# Patient Record
Sex: FEMALE | Race: WHITE
[De-identification: names, ages, dates, MRNs, and addresses within clinical notes are randomized per-mention and may not be internally consistent; named-entity substitution may affect disease eponyms.]

---

## 2020-11-30 ENCOUNTER — HOSPITAL ENCOUNTER (OUTPATIENT)
Dept: HOSPITAL 95 - LAB EV | Age: 70
Discharge: HOME | End: 2020-11-30
Attending: FAMILY MEDICINE
Payer: COMMERCIAL

## 2020-11-30 DIAGNOSIS — R10.9: Primary | ICD-10-CM

## 2021-03-07 ENCOUNTER — HOSPITAL ENCOUNTER (INPATIENT)
Dept: HOSPITAL 95 - ER | Age: 71
LOS: 7 days | Discharge: HOME | DRG: 871 | End: 2021-03-14
Attending: HOSPITALIST | Admitting: HOSPITALIST
Payer: COMMERCIAL

## 2021-03-07 VITALS — BODY MASS INDEX: 41.78 KG/M2 | WEIGHT: 244.71 LBS | HEIGHT: 64.02 IN

## 2021-03-07 DIAGNOSIS — Z91.018: ICD-10-CM

## 2021-03-07 DIAGNOSIS — E66.9: ICD-10-CM

## 2021-03-07 DIAGNOSIS — A41.89: Primary | ICD-10-CM

## 2021-03-07 DIAGNOSIS — I10: ICD-10-CM

## 2021-03-07 DIAGNOSIS — E11.65: ICD-10-CM

## 2021-03-07 DIAGNOSIS — J12.82: ICD-10-CM

## 2021-03-07 DIAGNOSIS — T38.0X5A: ICD-10-CM

## 2021-03-07 DIAGNOSIS — J96.01: ICD-10-CM

## 2021-03-07 DIAGNOSIS — E88.81: ICD-10-CM

## 2021-03-07 DIAGNOSIS — Z79.899: ICD-10-CM

## 2021-03-07 DIAGNOSIS — U07.1: ICD-10-CM

## 2021-03-07 DIAGNOSIS — K92.1: ICD-10-CM

## 2021-03-07 DIAGNOSIS — Z90.710: ICD-10-CM

## 2021-03-07 LAB
ALBUMIN SERPL BCP-MCNC: 3.2 G/DL (ref 3.4–5)
ALBUMIN/GLOB SERPL: 0.7 {RATIO} (ref 0.8–1.8)
ALT SERPL W P-5'-P-CCNC: 44 U/L (ref 12–78)
ANION GAP SERPL CALCULATED.4IONS-SCNC: 8 MMOL/L (ref 6–16)
AST SERPL W P-5'-P-CCNC: 60 U/L (ref 12–37)
BASOPHILS # BLD AUTO: 0.03 K/MM3 (ref 0–0.23)
BASOPHILS NFR BLD AUTO: 0 % (ref 0–2)
BILIRUB SERPL-MCNC: 0.3 MG/DL (ref 0.1–1)
BUN SERPL-MCNC: 47 MG/DL (ref 8–24)
CALCIUM SERPL-MCNC: 9.3 MG/DL (ref 8.5–10.1)
CHLORIDE SERPL-SCNC: 112 MMOL/L (ref 98–108)
CO2 SERPL-SCNC: 21 MMOL/L (ref 21–32)
CREAT SERPL-MCNC: 1.34 MG/DL (ref 0.4–1)
DEPRECATED RDW RBC AUTO: 49.7 FL (ref 35.1–46.3)
EOSINOPHIL # BLD AUTO: 0 K/MM3 (ref 0–0.68)
EOSINOPHIL NFR BLD AUTO: 0 % (ref 0–6)
ERYTHROCYTE [DISTWIDTH] IN BLOOD BY AUTOMATED COUNT: 14.3 % (ref 11.7–14.2)
GLOBULIN SER CALC-MCNC: 4.8 G/DL (ref 2.2–4)
GLUCOSE SERPL-MCNC: 148 MG/DL (ref 70–99)
HCT VFR BLD AUTO: 44.8 % (ref 33–51)
HGB BLD-MCNC: 14.9 G/DL (ref 11.5–16)
IMM GRANULOCYTES # BLD AUTO: 0.1 K/MM3 (ref 0–0.1)
IMM GRANULOCYTES NFR BLD AUTO: 1 % (ref 0–1)
LYMPHOCYTES # BLD AUTO: 1.51 K/MM3 (ref 0.84–5.2)
LYMPHOCYTES NFR BLD AUTO: 17 % (ref 21–46)
MCHC RBC AUTO-ENTMCNC: 33.3 G/DL (ref 31.5–36.5)
MCV RBC AUTO: 94 FL (ref 80–100)
MONOCYTES # BLD AUTO: 0.74 K/MM3 (ref 0.16–1.47)
MONOCYTES NFR BLD AUTO: 8 % (ref 4–13)
NEUTROPHILS # BLD AUTO: 6.42 K/MM3 (ref 1.96–9.15)
NEUTROPHILS NFR BLD AUTO: 73 % (ref 41–73)
NRBC # BLD AUTO: 0 K/MM3 (ref 0–0.02)
NRBC BLD AUTO-RTO: 0 /100 WBC (ref 0–0.2)
PLATELET # BLD AUTO: 265 K/MM3 (ref 150–400)
POTASSIUM SERPL-SCNC: 4 MMOL/L (ref 3.5–5.5)
PROT SERPL-MCNC: 8 G/DL (ref 6.4–8.2)
PROTHROMBIN TIME: 10.9 SEC (ref 9.7–11.5)
SODIUM SERPL-SCNC: 141 MMOL/L (ref 136–145)
TROPONIN I SERPL-MCNC: <0.015 NG/ML (ref 0–0.04)

## 2021-03-07 PROCEDURE — 3E0333Z INTRODUCTION OF ANTI-INFLAMMATORY INTO PERIPHERAL VEIN, PERCUTANEOUS APPROACH: ICD-10-PCS | Performed by: HOSPITALIST

## 2021-03-07 PROCEDURE — A9270 NON-COVERED ITEM OR SERVICE: HCPCS

## 2021-03-07 PROCEDURE — 8E0ZXY6 ISOLATION: ICD-10-PCS | Performed by: HOSPITALIST

## 2021-03-07 PROCEDURE — XW033E5 INTRODUCTION OF REMDESIVIR ANTI-INFECTIVE INTO PERIPHERAL VEIN, PERCUTANEOUS APPROACH, NEW TECHNOLOGY GROUP 5: ICD-10-PCS | Performed by: HOSPITALIST

## 2021-03-08 LAB
ANION GAP SERPL CALCULATED.4IONS-SCNC: 8 MMOL/L (ref 6–16)
BASOPHILS # BLD AUTO: 0.01 K/MM3 (ref 0–0.23)
BASOPHILS NFR BLD AUTO: 0 % (ref 0–2)
BUN SERPL-MCNC: 38 MG/DL (ref 8–24)
CALCIUM SERPL-MCNC: 8.6 MG/DL (ref 8.5–10.1)
CHLORIDE SERPL-SCNC: 111 MMOL/L (ref 98–108)
CO2 SERPL-SCNC: 21 MMOL/L (ref 21–32)
CREAT SERPL-MCNC: 0.89 MG/DL (ref 0.4–1)
DEPRECATED RDW RBC AUTO: 48.1 FL (ref 35.1–46.3)
EOSINOPHIL # BLD AUTO: 0 K/MM3 (ref 0–0.68)
EOSINOPHIL NFR BLD AUTO: 0 % (ref 0–6)
ERYTHROCYTE [DISTWIDTH] IN BLOOD BY AUTOMATED COUNT: 14.1 % (ref 11.7–14.2)
GLUCOSE SERPL-MCNC: 209 MG/DL (ref 70–99)
HCT VFR BLD AUTO: 40.1 % (ref 33–51)
HGB BLD-MCNC: 13.5 G/DL (ref 11.5–16)
IMM GRANULOCYTES # BLD AUTO: 0.15 K/MM3 (ref 0–0.1)
IMM GRANULOCYTES NFR BLD AUTO: 3 % (ref 0–1)
LYMPHOCYTES # BLD AUTO: 0.46 K/MM3 (ref 0.84–5.2)
LYMPHOCYTES NFR BLD AUTO: 10 % (ref 21–46)
MCHC RBC AUTO-ENTMCNC: 33.7 G/DL (ref 31.5–36.5)
MCV RBC AUTO: 93 FL (ref 80–100)
MONOCYTES # BLD AUTO: 0.17 K/MM3 (ref 0.16–1.47)
MONOCYTES NFR BLD AUTO: 4 % (ref 4–13)
NEUTROPHILS # BLD AUTO: 3.9 K/MM3 (ref 1.96–9.15)
NEUTROPHILS NFR BLD AUTO: 83 % (ref 41–73)
NRBC # BLD AUTO: 0 K/MM3 (ref 0–0.02)
NRBC BLD AUTO-RTO: 0 /100 WBC (ref 0–0.2)
PLATELET # BLD AUTO: 262 K/MM3 (ref 150–400)
POTASSIUM SERPL-SCNC: 4.7 MMOL/L (ref 3.5–5.5)
SODIUM SERPL-SCNC: 140 MMOL/L (ref 136–145)

## 2021-03-08 NOTE — NUR
ELEVATED BP / CALL TO NP
 
PT BP ELEVATED W/ PT REPORT OF "I HAVEN'T TAKEN MY BLOOD PRESSURE MEDICATION
IN 3 DAYS." CALL TO NP ITA W/ ONE TIME ORDER FOR PT HOME DOSE OF
LISINOPRIL, SEE ORDER. NP W/ INSTRUCTION FOR DAY SHIFT HOSPITALIST TO FOLLOW
UP ON FURTHER TREATMENT.

## 2021-03-08 NOTE — NUR
SHIFT SUMMARY
PT A&O X 4; PLEASANT & COMPLIANT W/ CARE; DENIES CHEST PAIN; VSS; O2 SATS >93
ON AIRVO, 40L 92%; DESATS QUICKLY W/ ACTIVITY; BSC PLACED DIRECTLY NEXT TO
BED; PT INDEPENDENT; AT TIMES FOUND DANGLING AT BEDSIDE W/ O2 OFF 70%, PT
SEEMED UNAWARE OF IMPORTANCE OF LEAVING IN PLACE; EDUCATION PROVIDED; PT
TALKATIVE AND ABLE TO TALK IN COMPLETE SENTENCES; LR GTT @ 125 ML/HR; PO
SNACKS BROUGHT TO PT PRN; CALL LIGHT IN REACH; BED IN LOWEST POSITION; WILL
MONITOR CLOSELY UNTIL HAND OFF TO DAY SHIFT RN.

## 2021-03-08 NOTE — NUR
ASSUMED CARE
PT ARRIVED FROM ER VIA STRETCHER, STOOD AND SELF TRANSFERED TO BED; A&O X4 ;
VSS; DENIES CHEST PAIN; NSR NOTED ON TELE; O2 SATS >90 ON 15L NRB; INDEPENDENT
TO BSC; ORIENTED TO UNIT SAFETY PROTOCOL AND CALL LIGHT; CALL LIGHT IN REACH;
BED IN LOWEST POSITION.

## 2021-03-08 NOTE — NUR
SHIFT SUMMARY:
 
NO ACUTE CHANGES T/OUT SHIFT. PT CONTINUES A&O, MAINTAINING O2 SATS >92% ON
AIRVO AT 40 L/MIN AND 92% FIO2, SINUS RHTYHM ON MONITOR. PT ABLE TO TRANSFER
SELF TO AND FROM BSC/CHAIR WITH MINIMAL DESATURATION. PT REPORTS IMPROVED
APPETITE TODAY, USING CALL LIGHT APPROPRIATELY. WILL CONTINUE TO MONITOR AND
TREAT ACCORDINGLY UNTIL CHANGE OF SHIFT.

## 2021-03-09 NOTE — NUR
SHIFT SUMMARY
 
PT A&O X4. VSS. BP IMPROVED AFTER 1X ORDER OF PT's HOME MEDICATION LISINOPRIL.
MONITOR SHOWS SR, HR 60's-70's. SPO2 > 92% ON AIRVO @ 40L, FIO2 92%, TITRATED
TO 30L, FIO2 65% THIS SHIFT W/ PT TOLERATING WELL. PT INDEPENDENT TO BSC. NO
EVENTS OVER NIGHT.

## 2021-03-09 NOTE — NUR
Wearing oxygen at 8 l/min hi flow nasal cannula.  Self transfer to bedside
commode.  Now sitting on side of bed, resting chin on her fist on bedside
table, looking out window in door and smiling, giving me the thumbs up.  spo2
is improving from briefly being at 79%, not 87% following the activity.

## 2021-03-09 NOTE — NUR
Pt has been OOB to BSC very frequently today, spo2 dropping down to as low as
75% during the activity, sometimes only as low as 85% during such activity.
Also has spo2 85-88% while sitting up eating dinner.  Encouraged use of
incentive spirometer, which she has been doing. Supplemental oxygen was weaned
down from AirVo to hi arley nasal cannula, now at 8 l/min.
Also encouraged OOB to recliner for meals but she said that it hurts her back.
She instead opted to sit up and sit on the side of the bed for dinner.
States she doesn't feel short of breath, has not had pain, and hasn't had loss
of taste/smell.  States she does not have fatigue, but is "naturally lazy".

## 2021-03-09 NOTE — NUR
CARE ASSUMPTION
 
PT A&O X4. VSS. MONITOR SHOWS NSR, HR 70's. SPO2 87-90% ON 8L HI-DEBORAH NC AT
REST, TITRATED TO 10L HI-DEBORAH FOR SPO2 > 92%. PT ENCOURAGED TO PRONE, BUT PT
REPORTS INABILITY D/T "BOOBS IN THE WAY." PT REPORTS SHE WILL ROTATE FROM SIDE
TO SIDE & SIT UP FREQUENTLY AS ABLE.

## 2021-03-09 NOTE — NUR
Pt was sitting up in chair at the time of morning medication administration.
She had just finished brushing her teeth.  She was talking in complete
sentences, and very talkative, without noted dyspnea at rest while sitting.
Took oral medications without any noted difficulty. Wearing hi arley nasal
cannula with O2 delivery at 10 l/min at the time.  She asked to get back into
bed, which she did with very minimal assistance.  Spo2 dropped to 85-86%
during the activity, but improved to 90% within just a couple of minutes.
Presently she is still wearing the O2 delivery at 10 l/min, resting while
apparently sleeping on her left side, and spo2 is 93-94%.

## 2021-03-10 LAB
ANION GAP SERPL CALCULATED.4IONS-SCNC: 8 MMOL/L (ref 6–16)
BUN SERPL-MCNC: 32 MG/DL (ref 8–24)
CALCIUM SERPL-MCNC: 9.2 MG/DL (ref 8.5–10.1)
CHLORIDE SERPL-SCNC: 112 MMOL/L (ref 98–108)
CO2 SERPL-SCNC: 24 MMOL/L (ref 21–32)
CREAT SERPL-MCNC: 0.86 MG/DL (ref 0.4–1)
DEPRECATED RDW RBC AUTO: 49.3 FL (ref 35.1–46.3)
ERYTHROCYTE [DISTWIDTH] IN BLOOD BY AUTOMATED COUNT: 13.9 % (ref 11.7–14.2)
GLUCOSE SERPL-MCNC: 137 MG/DL (ref 70–99)
HCT VFR BLD AUTO: 40.8 % (ref 33–51)
HCT VFR BLD AUTO: 43.3 % (ref 33–51)
HGB BLD-MCNC: 13.8 G/DL (ref 11.5–16)
HGB BLD-MCNC: 14.1 G/DL (ref 11.5–16)
MCHC RBC AUTO-ENTMCNC: 32.6 G/DL (ref 31.5–36.5)
MCV RBC AUTO: 96 FL (ref 80–100)
NRBC # BLD AUTO: 0 K/MM3 (ref 0–0.02)
NRBC BLD AUTO-RTO: 0 /100 WBC (ref 0–0.2)
PLATELET # BLD AUTO: 341 K/MM3 (ref 150–400)
POTASSIUM SERPL-SCNC: 4.7 MMOL/L (ref 3.5–5.5)
SODIUM SERPL-SCNC: 144 MMOL/L (ref 136–145)

## 2021-03-10 NOTE — NUR
Tolerating toileting independently at BSC on 8 l/min O2 delivery; spo2
maintaiing 88-90%.  HR 89 bpm.

## 2021-03-10 NOTE — NUR
Assisted up for shower, and she tolerated it quite well; O2 was increased to
12 l/min during the shower.  Denied difficulty breathing while in shower.
Afterwards, she ambulated to the bed and was 88% spo2 still on the 12 l/min.
She felt tired she said, and had mild shortness of breath.  Recovered to
90-91% spo2 after 2-5 minutes of sitting on side of bed. O2 decreased to 10
l/min.
At this time, she is 95% spo2 at rest, sitting up in bed.

## 2021-03-10 NOTE — NUR
Sat up on side of bed this morning after vital signs taken.  Lung sounds
ausculated, noted coarse crackles only in upper lobes. Pt states small amounts
of clear sputum is being coughed up, occasionally.  spo2 88-89 while sitting
on side of bed, eating breakfast on 10 l/min O2 by nasal cannula.
Denies pain, denies dyspnea while at rest.  States she does feel a little
short of breath while up to [facility], which she is doing independently.  She is
able to carry on normal conversation and complete long sentences without
difficulty while at rest.
States not much of an appetite.
Encouraged use of incentive spirometer and activity such as OOB to chair for
meals.  She would like to get up and get into the shower today.

## 2021-03-10 NOTE — NUR
Notified Dr. Pan of black stool, which the patient stated that she started to
have a week ago. States that she was taking aspirin at home, and here in the
hospital she has been taking xarelto.  She has been having frequent small
formed soft stools, states black in color.  New orders received and stool
sample sent for guaiac to lab.  Xarelto dc'd, omeprazole dosing adjusted and
SCDs additional lab work also ordered at this time.

## 2021-03-10 NOTE — NUR
ASSISTED up to shower, spo2 maintained 90% during walk to and
sitting on commode.  Carrying on conversation cheerfully while sitting in
shower chair, bathing with CNA assistance.

## 2021-03-10 NOTE — NUR
SHIFT SUMMARY
 
PT CONTINUES TO BE A&O X4. VSS. SPO2 > 92% ON 10L HI-DEBORAH NC. NO EVENTS OVER
NIGHT.

## 2021-03-11 LAB
ANION GAP SERPL CALCULATED.4IONS-SCNC: 6 MMOL/L (ref 6–16)
BUN SERPL-MCNC: 23 MG/DL (ref 8–24)
CALCIUM SERPL-MCNC: 8.8 MG/DL (ref 8.5–10.1)
CHLORIDE SERPL-SCNC: 110 MMOL/L (ref 98–108)
CO2 SERPL-SCNC: 27 MMOL/L (ref 21–32)
CREAT SERPL-MCNC: 0.76 MG/DL (ref 0.4–1)
DEPRECATED RDW RBC AUTO: 46.2 FL (ref 35.1–46.3)
ERYTHROCYTE [DISTWIDTH] IN BLOOD BY AUTOMATED COUNT: 13.6 % (ref 11.7–14.2)
GLUCOSE SERPL-MCNC: 141 MG/DL (ref 70–99)
HCT VFR BLD AUTO: 37.8 % (ref 33–51)
HGB BLD-MCNC: 12.5 G/DL (ref 11.5–16)
MCHC RBC AUTO-ENTMCNC: 33.1 G/DL (ref 31.5–36.5)
MCV RBC AUTO: 93 FL (ref 80–100)
NRBC # BLD AUTO: 0 K/MM3 (ref 0–0.02)
NRBC BLD AUTO-RTO: 0 /100 WBC (ref 0–0.2)
PLATELET # BLD AUTO: 317 K/MM3 (ref 150–400)
POTASSIUM SERPL-SCNC: 3.9 MMOL/L (ref 3.5–5.5)
SODIUM SERPL-SCNC: 143 MMOL/L (ref 136–145)

## 2021-03-11 NOTE — NUR
After eating lunch, she is lying on her left side, appears to be sleeping.
spo2 92% on 10 l/min n.c. hi flow.

## 2021-03-11 NOTE — NUR
Strongly recommended pt to continue use of incentive spirometer, changing her
positions, getting up and moving around the room, and attempting proning
position.  She used the incentive spirometer this morning, and [facility] was moved
half way between bed and bathroom to encourage her to be more mobile.
She is not really attempting to prone, even after my strong recommendation
that she do it right now.  Pt has been only lying on her left side, and after
being instructed to prone at this time, she is only lying again on the left
side.  I encouraged her this morning to not always lie on the left side, but
to vary her positions when in the bed.  Spo2 88% on 10 l/min n.c. delivery at
this time.

## 2021-03-11 NOTE — NUR
SHIFT SUMMARY
PATIENT PLEASENT AND COOPERATIVE THROUGHOUT THE NIGHT. PATIENT APPEARED TO
SLEEP WELL LAST NIGHT WITH NO COMPLAINTS OF PAIN OR DISCOMFORT. VITAL SIGNS AS
CHARTED. PATIENT ON 6L VIA HIGH FLOW NASAL CANNULA. PATIENT CURRENTLY APPEARS
TO BE SLEEPING. REPORT GIVEN TO ONCOMING RN.

## 2021-03-11 NOTE — NUR
spo2 dropped to 79-80% while pt was sitting on side of bed, eating breakfast
while on 6 l/min n.c. delivery She stated that she did not feel any dyspnea or
difficulty breathing at that time. Lung sounds clear except for left lower
lobe, which has fine inspiratory crackles.  Oxygen was increased to 12 l/min
for recovery, which improved spo2 to 82-86% finally after several minutes.  O2
delivery is now at 10 l/min.  She is talking, laughing and states that she
feels fine.  While talking, spo2 drops to 83%.  Encouraged to stop talking and
take deep breaths through her nose to increase oxygen that she is getting.
spo2 improved to 85%.

## 2021-03-11 NOTE — NUR
Assisted OOB to shower; pt required 10 l of O2, but afterwards it was weaned
down to 8 l and she maintained the same spo2 of 89-90% on the 8 l.

## 2021-03-12 LAB
ANION GAP SERPL CALCULATED.4IONS-SCNC: 5 MMOL/L (ref 6–16)
BUN SERPL-MCNC: 22 MG/DL (ref 8–24)
CALCIUM SERPL-MCNC: 8.8 MG/DL (ref 8.5–10.1)
CHLORIDE SERPL-SCNC: 109 MMOL/L (ref 98–108)
CO2 SERPL-SCNC: 27 MMOL/L (ref 21–32)
CREAT SERPL-MCNC: 0.83 MG/DL (ref 0.4–1)
DEPRECATED RDW RBC AUTO: 45.7 FL (ref 35.1–46.3)
ERYTHROCYTE [DISTWIDTH] IN BLOOD BY AUTOMATED COUNT: 13.5 % (ref 11.7–14.2)
GLUCOSE SERPL-MCNC: 151 MG/DL (ref 70–99)
HCT VFR BLD AUTO: 39.8 % (ref 33–51)
HGB BLD-MCNC: 13.5 G/DL (ref 11.5–16)
MCHC RBC AUTO-ENTMCNC: 33.9 G/DL (ref 31.5–36.5)
MCV RBC AUTO: 93 FL (ref 80–100)
NRBC # BLD AUTO: 0 K/MM3 (ref 0–0.02)
NRBC BLD AUTO-RTO: 0 /100 WBC (ref 0–0.2)
PLATELET # BLD AUTO: 315 K/MM3 (ref 150–400)
POTASSIUM SERPL-SCNC: 4.3 MMOL/L (ref 3.5–5.5)
SODIUM SERPL-SCNC: 141 MMOL/L (ref 136–145)

## 2021-03-12 NOTE — NUR
SHIFT SUMMARY
PT A/O X4; PLEASANT AND COOPERATIVE WITH CARE. NO ACUTE CHANGES THIS SHIFT. PT
REPORTS "FEELING MUCH BETTER" TODAY. SHE HAS BEEN LESS DYSPNEIC BUT STILL IS
HESITANT TO WALK TO THE BATHROOM. CURRENTLY ON 8 LITERS O2 VIA NC AND HAS BEEN
SATTING AT AROUND 91% ALL SHIFT. SHE DID HAVE AN INSTANCE OF BRADYCARDIA WHILE
SHE WAS SLEEPING WITH A HR OF 48. THE BRADYCARDIA HAS SINCE RESOLVED. WILL
POSSIBLY DC HOME TOMORROW. VSS; RESTING COMFORTABLY WITH HER CALL LIGHT IN
REACH.

## 2021-03-12 NOTE — NUR
SUMMARY
PT HAD NO ACUTE ISSUES NOTED. PT REMAINS ON 8 LPM O2. PT HAS NOT REQUIRED MORE
O2. PT DENIES SOB OR CX PAIN. PT HAS RESTED/ SLEPT WELL T/O SHIFT. PT
CURRENTLY SLEEPING IN NO DISTRESS. CALL LIGHT IN REACH.

## 2021-03-13 NOTE — NUR
pt laying in bed awake a/ox3, pleasant and cooperative with care, follows
commands well, denies complaints, states she feels good, and is interested in
going home today, sats were in the 80's, was on 5 liters, turned her up to 8
liters to get sats up, denies feeling sob, she really drops with activity,
lungs are clear in upper fields, dim in bases, crackles in the right base,
reports an occ productive cough of clear sputum, hrr, tele in place running sr
in the 70's, no edema noted, ppp+2, cap refill< 3 sec, vs stable, afebrile, iv
site is clear and patent, btx4, abd flat soft nontender, voids without diff,
skin c/w/d, flaco mata, call light in reach.

## 2021-03-13 NOTE — NUR
SHIFT SUMMARY
ASSUMED CARE OF PT AT 1900. PT IS A/OX4. HEART SOUNDS REGULAR, LUNG SOUNDS
HAVE CRACKLES IN THE L LUNG BASE, PT EDUCATED ABOUT ROLLING IN BED AND USING
THE BATHROOM INSTEAD OF COMMODE TO PROMOTE MOVEMENT OF THE FLUIDS. PT
UNDERSTOOD WITH VERBAL CONFIRMATION BUT CONTINUED TO USE THE COMMODE. PT IS
INDEPENDENT IN ROOM. PT WAS ON 8L NC BUT IS NOT ON 6L. PT SATURATIONS ARE
90-94% AND WILL DESATURATE WTIH ACTIVITY, PT REPORTS HAVING NO SOB AND FEELING
MUCH BETTER TONIGHT. PT HEART RATE WOULD DIP DOWN TO 49 BPM DURING DEEP SLEEP.
 
CALL LIGHT IN REACH, BED IN LOWEST POSTION.

## 2021-03-13 NOTE — NUR
pt was interested in going home today, Dr. Pan was agreeable after a home o2
eval, after speaking with her she decided to stay one more day.  notified.
her status was changed to medical. call light in reach.

## 2021-03-13 NOTE — NUR
ENCOURAGED PT TO AMBULATE INTO THE BATHROOM TO VOID, AS SHE IS ABLE TO
AMBULATE INDEP. STATES SHE'S LAZY. NO ACUTE CHANGES OR NEEDS. CALL LIGHT IN
REACH.

## 2021-03-14 LAB
ANION GAP SERPL CALCULATED.4IONS-SCNC: 5 MMOL/L (ref 6–16)
BUN SERPL-MCNC: 24 MG/DL (ref 8–24)
CALCIUM SERPL-MCNC: 9.1 MG/DL (ref 8.5–10.1)
CHLORIDE SERPL-SCNC: 107 MMOL/L (ref 98–108)
CO2 SERPL-SCNC: 28 MMOL/L (ref 21–32)
CREAT SERPL-MCNC: 0.89 MG/DL (ref 0.4–1)
DEPRECATED RDW RBC AUTO: 45.5 FL (ref 35.1–46.3)
ERYTHROCYTE [DISTWIDTH] IN BLOOD BY AUTOMATED COUNT: 13.4 % (ref 11.7–14.2)
GLUCOSE SERPL-MCNC: 177 MG/DL (ref 70–99)
HCT VFR BLD AUTO: 42.1 % (ref 33–51)
HGB BLD-MCNC: 13.8 G/DL (ref 11.5–16)
MCHC RBC AUTO-ENTMCNC: 32.8 G/DL (ref 31.5–36.5)
MCV RBC AUTO: 94 FL (ref 80–100)
NRBC # BLD AUTO: 0 K/MM3 (ref 0–0.02)
NRBC BLD AUTO-RTO: 0 /100 WBC (ref 0–0.2)
PLATELET # BLD AUTO: 391 K/MM3 (ref 150–400)
POTASSIUM SERPL-SCNC: 4.3 MMOL/L (ref 3.5–5.5)
SODIUM SERPL-SCNC: 140 MMOL/L (ref 136–145)

## 2021-03-14 NOTE — NUR
DISCHARGE NOTE
PATIENT DISCHARGED TO HOME. PATIENT ALERT, ORIENTED, AND INDEPENDENT IN THE
ROOM THIS SHIFT. PATIENT DENIES PAIN THIS SHIFT. PATIENT REMAINS ON 5-6L O2,
WILL CONTINUE O2 AT HOME. PATIENT STATES SHE IS FEELING PRETTY WELL
TODAY. PATIENT DENIES DIFFICULTY BREATHING. PATIENT PROVIDED WITH
DISCHARGE AND MEDICATION EDUCATION. PATIENT STATES NO QUESTIONS AT THIS TIME.
IV REMOVED PRIOR TO DISCHARGE. PATIENT DRESSED INDEPENDENTLY. PATIENT TO
VEHICLE IN WHEELCHAIR BY CNA.

## 2021-03-14 NOTE — NUR
SHIFT SUMMARY
ASSUMED CARE OF PT AT 1900. PT IS A/OX4. HEART SOUNDS REGULAR, LUNG SOUNDS ARE
DIMINISHED WITH CRACKLES AT THE BASES. PT WAS ON 6L NC T/O THE NIGHT AND WAS
TITRATED DOWN TO 5 THIS AM. PT IS TOLERATING WELL, DENIES SOB. PT WILL DIP
INTO 89 WITH ACTIVITY BUT WILL BOUNCE BACK UP QUICKLY. PT IS INDEPENDENT IN
ROOM.
 
CALL LIGHT IN REACH, BED IN LOWEST POSITON.

## 2022-04-06 ENCOUNTER — HOSPITAL ENCOUNTER (OUTPATIENT)
Dept: HOSPITAL 95 - ER | Age: 72
Setting detail: OBSERVATION
LOS: 1 days | Discharge: HOME | End: 2022-04-07
Attending: SURGERY | Admitting: SURGERY
Payer: COMMERCIAL

## 2022-04-06 VITALS — BODY MASS INDEX: 40.84 KG/M2 | WEIGHT: 239.2 LBS | HEIGHT: 64 IN

## 2022-04-06 DIAGNOSIS — E66.9: ICD-10-CM

## 2022-04-06 DIAGNOSIS — I10: ICD-10-CM

## 2022-04-06 DIAGNOSIS — Z91.018: ICD-10-CM

## 2022-04-06 DIAGNOSIS — E11.9: ICD-10-CM

## 2022-04-06 DIAGNOSIS — K43.6: Primary | ICD-10-CM

## 2022-04-06 LAB
ALBUMIN SERPL BCP-MCNC: 3.9 G/DL (ref 3.4–5)
ALBUMIN/GLOB SERPL: 1.1 {RATIO} (ref 0.8–1.8)
ALT SERPL W P-5'-P-CCNC: 36 U/L (ref 12–78)
ANION GAP SERPL CALCULATED.4IONS-SCNC: 6 MMOL/L (ref 6–16)
AST SERPL W P-5'-P-CCNC: 24 U/L (ref 12–37)
BASOPHILS # BLD AUTO: 0.07 K/MM3 (ref 0–0.23)
BASOPHILS NFR BLD AUTO: 1 % (ref 0–2)
BILIRUB SERPL-MCNC: 0.6 MG/DL (ref 0.1–1)
BUN SERPL-MCNC: 13 MG/DL (ref 8–24)
CALCIUM SERPL-MCNC: 9.4 MG/DL (ref 8.5–10.1)
CHLORIDE SERPL-SCNC: 104 MMOL/L (ref 98–108)
CO2 SERPL-SCNC: 31 MMOL/L (ref 21–32)
CREAT SERPL-MCNC: 0.87 MG/DL (ref 0.4–1)
DEPRECATED RDW RBC AUTO: 49.7 FL (ref 35.1–46.3)
EOSINOPHIL # BLD AUTO: 0.11 K/MM3 (ref 0–0.68)
EOSINOPHIL NFR BLD AUTO: 1 % (ref 0–6)
ERYTHROCYTE [DISTWIDTH] IN BLOOD BY AUTOMATED COUNT: 15.9 % (ref 11.7–14.2)
GLOBULIN SER CALC-MCNC: 3.7 G/DL (ref 2.2–4)
GLUCOSE SERPL-MCNC: 157 MG/DL (ref 70–99)
HCT VFR BLD AUTO: 43.6 % (ref 33–51)
HGB BLD-MCNC: 13.8 G/DL (ref 11.5–16)
IMM GRANULOCYTES # BLD AUTO: 0.01 K/MM3 (ref 0–0.1)
IMM GRANULOCYTES NFR BLD AUTO: 0 % (ref 0–1)
LEUKOCYTE ESTERASE UR QL STRIP: (no result)
LYMPHOCYTES # BLD AUTO: 1.76 K/MM3 (ref 0.84–5.2)
LYMPHOCYTES NFR BLD AUTO: 23 % (ref 21–46)
MCHC RBC AUTO-ENTMCNC: 31.7 G/DL (ref 31.5–36.5)
MCV RBC AUTO: 87 FL (ref 80–100)
MONOCYTES # BLD AUTO: 0.66 K/MM3 (ref 0.16–1.47)
MONOCYTES NFR BLD AUTO: 9 % (ref 4–13)
NEUTROPHILS # BLD AUTO: 4.98 K/MM3 (ref 1.96–9.15)
NEUTROPHILS NFR BLD AUTO: 66 % (ref 41–73)
NRBC # BLD AUTO: 0 K/MM3 (ref 0–0.02)
NRBC BLD AUTO-RTO: 0 /100 WBC (ref 0–0.2)
PLATELET # BLD AUTO: 276 K/MM3 (ref 150–400)
POTASSIUM SERPL-SCNC: 4.7 MMOL/L (ref 3.5–5.5)
PROT SERPL-MCNC: 7.6 G/DL (ref 6.4–8.2)
RBC #/AREA URNS HPF: (no result) /HPF (ref 0–2)
SODIUM SERPL-SCNC: 141 MMOL/L (ref 136–145)
SP GR SPEC: 1.01 (ref 1–1.02)
UROBILINOGEN UR STRIP-MCNC: (no result) MG/DL
WBC #/AREA URNS HPF: (no result) /HPF (ref 0–5)

## 2022-04-06 PROCEDURE — A9270 NON-COVERED ITEM OR SERVICE: HCPCS

## 2022-04-06 NOTE — NUR
pt received from day surg. report from kim just prior to shift change. she
states will ask dr to put in diet and other orders. pt pleasant, vss. h/r reg,
no murmer noted. lungs clear upper and mid. light crackles bases. presently on
2l o2. denies pain. a/o x3. talkative. retreived pillow for holding about abd
for coughing. abd has binder in place, no blood / drainage noted thru pad.
passed report to maya dejesus. brianna shift. bed in low position, calllite in reach,
calls approp.

## 2022-04-07 NOTE — NUR
NIGHT SHIFT SUMMARY
NEW ADMIT AT THE START OF SHIFT FROM OR. PT HAD ABD HERNIA REPAIR. SURGICAL
INCISION TO UPPER ABD C/D/I WITH WOUND GLUE. ABD BINDER IN PLACE. PT HAS
TOLERATED CLEAR LIQUID DIET THUS FAR AND HAS AMBULATED AND URINATED MULTIPLE
TIMES TONIGHT. MEDICATED WITH NORCO X2 PER EMAR WITH GOOD PAIN RELIEF. VSS,
WILL CONTINUE TO MONITOR.

## 2022-04-07 NOTE — NUR
Pt. is sitting up in bed and welcomes my visit. Pt. is bouyant with the plan
to soon be discharged. Pt. is a little unsettled about further recovery as
there are children in the home. Through empathetic listening and pastoral
 we addressed her rosa and belief. Pt. displayed evidence of reduced
stress and engagement. Prayed for pt. Pt. verbalized gratitude for the
spiritual are visit.

## 2022-04-07 NOTE — NUR
DISCHARGE
PT LEFT AT 1130
ALL BELONGINGS WITH PATIENT, MIDLINE INCISION REMAINS CDI. PT PAIN WELL
CONTROLLED WITH PO. TOLERATING REGULAR DIET WITH NO NAUSEA OR VOMITING. PT IND
IN ROOM, NO WEAKNESS WITH MOVEMENT. ALL INSTRUCTIONS GONE OVER WITH PATIENT,
IV REMOVED. NO FURTHER QUESTIONS FROM PATIENT AT THIS TIME./